# Patient Record
Sex: MALE | Race: WHITE | Employment: FULL TIME | URBAN - METROPOLITAN AREA
[De-identification: names, ages, dates, MRNs, and addresses within clinical notes are randomized per-mention and may not be internally consistent; named-entity substitution may affect disease eponyms.]

---

## 2017-01-31 ENCOUNTER — OFFICE VISIT (OUTPATIENT)
Dept: FAMILY MEDICINE CLINIC | Facility: CLINIC | Age: 47
End: 2017-01-31

## 2017-01-31 VITALS
WEIGHT: 315 LBS | TEMPERATURE: 98 F | DIASTOLIC BLOOD PRESSURE: 78 MMHG | BODY MASS INDEX: 41.75 KG/M2 | SYSTOLIC BLOOD PRESSURE: 132 MMHG | RESPIRATION RATE: 18 BRPM | HEART RATE: 110 BPM | HEIGHT: 73 IN

## 2017-01-31 DIAGNOSIS — J01.10 ACUTE FRONTAL SINUSITIS, RECURRENCE NOT SPECIFIED: Primary | ICD-10-CM

## 2017-01-31 PROCEDURE — 99214 OFFICE O/P EST MOD 30 MIN: CPT | Performed by: FAMILY MEDICINE

## 2017-01-31 RX ORDER — GUAIFENESIN AND CODEINE PHOSPHATE 100; 10 MG/5ML; MG/5ML
5 SOLUTION ORAL 3 TIMES DAILY PRN
Qty: 118 ML | Refills: 0 | Status: SHIPPED | OUTPATIENT
Start: 2017-01-31 | End: 2017-02-06

## 2017-01-31 RX ORDER — AMOXICILLIN AND CLAVULANATE POTASSIUM 875; 125 MG/1; MG/1
1 TABLET, FILM COATED ORAL 2 TIMES DAILY
Qty: 14 TABLET | Refills: 0 | Status: SHIPPED | OUTPATIENT
Start: 2017-01-31 | End: 2017-02-06 | Stop reason: ALTCHOICE

## 2017-01-31 NOTE — PROGRESS NOTES
CC:  Sidney Rayo is a 48year old male here for Patient presents with:  URI: head congestion, sore throat, cough with yellow phlegm, and dizziness started 4 days ago      HPI:     URI  -started 4 days ago  -associated with head congestions, sore throa c/w/r  CARDIO: RRR without g/m/r  EXTREMITIES: no cyanosis, clubbing or edema    ASSESSMENT AND PLAN:     URI   -given severe worsening of sinus pressure, will start augmentin as prescribed  -c/w tylenol cold and sinus  -- start guaifenesin with codeine pr

## 2017-01-31 NOTE — PATIENT INSTRUCTIONS
-- continue tylenol cold and sinus as needed  -- start augmentin 2x/day for 7 days - take with food  -- cough syrup as needed for severe cough - may cause drowsiness

## 2017-02-06 ENCOUNTER — OFFICE VISIT (OUTPATIENT)
Dept: FAMILY MEDICINE CLINIC | Facility: CLINIC | Age: 47
End: 2017-02-06

## 2017-02-06 VITALS
BODY MASS INDEX: 41.75 KG/M2 | DIASTOLIC BLOOD PRESSURE: 80 MMHG | RESPIRATION RATE: 16 BRPM | HEIGHT: 73 IN | TEMPERATURE: 98 F | OXYGEN SATURATION: 97 % | HEART RATE: 88 BPM | WEIGHT: 315 LBS | SYSTOLIC BLOOD PRESSURE: 130 MMHG

## 2017-02-06 DIAGNOSIS — J01.10 ACUTE FRONTAL SINUSITIS, RECURRENCE NOT SPECIFIED: Primary | ICD-10-CM

## 2017-02-06 PROCEDURE — 99214 OFFICE O/P EST MOD 30 MIN: CPT | Performed by: FAMILY MEDICINE

## 2017-02-06 RX ORDER — AZITHROMYCIN 250 MG/1
TABLET, FILM COATED ORAL
Qty: 6 TABLET | Refills: 0 | Status: SHIPPED | OUTPATIENT
Start: 2017-02-06 | End: 2018-08-02 | Stop reason: ALTCHOICE

## 2017-02-06 RX ORDER — GUAIFENESIN AND CODEINE PHOSPHATE 100; 10 MG/5ML; MG/5ML
5 SOLUTION ORAL 3 TIMES DAILY PRN
Qty: 118 ML | Refills: 0 | Status: SHIPPED | OUTPATIENT
Start: 2017-02-06 | End: 2017-02-20

## 2017-02-06 RX ORDER — METHYLPREDNISOLONE 4 MG/1
TABLET ORAL
Qty: 1 KIT | Refills: 0 | Status: SHIPPED | OUTPATIENT
Start: 2017-02-06 | End: 2018-08-02 | Stop reason: ALTCHOICE

## 2017-02-06 NOTE — PATIENT INSTRUCTIONS
-- start medrol steroid dose pack  -- continue cough medicine as needed  -- if symptoms not significantly improving by late tomorrow, start azithromycin antibiotics (take to pharmacy)  -- if you take these - also start otc probiotics once daily for one mon

## 2017-02-07 NOTE — PROGRESS NOTES
CC:  Yakov Barker is a 48year old male here for Patient presents with:   Follow - Up: Clogged Right ear, cough, congestion, sinus pressure, headache       HPI:     URI  -started about 1.5 wks ago  -last week was seen and given augmentin  -initially sta normal  NECK: supple, no thyromegaly, no LAD  LUNGS: clear to auscultation bilateraly, no c/w/r  CARDIO: RRR without g/m/r  EXTREMITIES: no cyanosis, clubbing or edema    ASSESSMENT AND PLAN:     Sinusitis  -given he just completed augmentin, will try medr

## 2018-08-02 ENCOUNTER — LAB ENCOUNTER (OUTPATIENT)
Dept: LAB | Age: 48
End: 2018-08-02
Attending: FAMILY MEDICINE
Payer: COMMERCIAL

## 2018-08-02 ENCOUNTER — OFFICE VISIT (OUTPATIENT)
Dept: FAMILY MEDICINE CLINIC | Facility: CLINIC | Age: 48
End: 2018-08-02
Payer: COMMERCIAL

## 2018-08-02 VITALS
WEIGHT: 315 LBS | HEIGHT: 73.23 IN | BODY MASS INDEX: 41.3 KG/M2 | TEMPERATURE: 98 F | SYSTOLIC BLOOD PRESSURE: 138 MMHG | DIASTOLIC BLOOD PRESSURE: 84 MMHG | HEART RATE: 86 BPM

## 2018-08-02 DIAGNOSIS — Z12.5 SCREENING FOR MALIGNANT NEOPLASM OF PROSTATE: ICD-10-CM

## 2018-08-02 DIAGNOSIS — E66.01 MORBID OBESITY WITH BMI OF 40.0-44.9, ADULT (HCC): ICD-10-CM

## 2018-08-02 DIAGNOSIS — Z00.00 ROUTINE GENERAL MEDICAL EXAMINATION AT A HEALTH CARE FACILITY: ICD-10-CM

## 2018-08-02 DIAGNOSIS — Z12.11 SCREENING FOR MALIGNANT NEOPLASM OF COLON: ICD-10-CM

## 2018-08-02 DIAGNOSIS — Z23 NEED FOR VACCINATION: ICD-10-CM

## 2018-08-02 DIAGNOSIS — Z00.00 ROUTINE GENERAL MEDICAL EXAMINATION AT A HEALTH CARE FACILITY: Primary | ICD-10-CM

## 2018-08-02 LAB
ALBUMIN SERPL-MCNC: 3.8 G/DL (ref 3.5–4.8)
ALBUMIN/GLOB SERPL: 1 {RATIO} (ref 1–2)
ALP LIVER SERPL-CCNC: 60 U/L (ref 45–117)
ALT SERPL-CCNC: 32 U/L (ref 17–63)
ANION GAP SERPL CALC-SCNC: 9 MMOL/L (ref 0–18)
AST SERPL-CCNC: 15 U/L (ref 15–41)
BASOPHILS # BLD AUTO: 0.05 X10(3) UL (ref 0–0.1)
BASOPHILS NFR BLD AUTO: 0.8 %
BILIRUB SERPL-MCNC: 0.3 MG/DL (ref 0.1–2)
BUN BLD-MCNC: 14 MG/DL (ref 8–20)
BUN/CREAT SERPL: 13.9 (ref 10–20)
CALCIUM BLD-MCNC: 9.4 MG/DL (ref 8.3–10.3)
CHLORIDE SERPL-SCNC: 108 MMOL/L (ref 101–111)
CHOLEST SMN-MCNC: 182 MG/DL (ref ?–200)
CO2 SERPL-SCNC: 25 MMOL/L (ref 22–32)
COMPLEXED PSA SERPL-MCNC: 0.71 NG/ML (ref 0.01–4)
CREAT BLD-MCNC: 1.01 MG/DL (ref 0.7–1.3)
EOSINOPHIL # BLD AUTO: 0.25 X10(3) UL (ref 0–0.3)
EOSINOPHIL NFR BLD AUTO: 4 %
ERYTHROCYTE [DISTWIDTH] IN BLOOD BY AUTOMATED COUNT: 13.2 % (ref 11.5–16)
GLOBULIN PLAS-MCNC: 3.9 G/DL (ref 2.5–3.7)
GLUCOSE BLD-MCNC: 90 MG/DL (ref 70–99)
HCT VFR BLD AUTO: 44 % (ref 37–53)
HDLC SERPL-MCNC: 39 MG/DL (ref 40–59)
HGB BLD-MCNC: 13.8 G/DL (ref 13–17)
IMMATURE GRANULOCYTE COUNT: 0.01 X10(3) UL (ref 0–1)
IMMATURE GRANULOCYTE RATIO %: 0.2 %
LDLC SERPL CALC-MCNC: 120 MG/DL (ref ?–100)
LYMPHOCYTES # BLD AUTO: 2.03 X10(3) UL (ref 0.9–4)
LYMPHOCYTES NFR BLD AUTO: 32.5 %
M PROTEIN MFR SERPL ELPH: 7.7 G/DL (ref 6.1–8.3)
MCH RBC QN AUTO: 28.4 PG (ref 27–33.2)
MCHC RBC AUTO-ENTMCNC: 31.4 G/DL (ref 31–37)
MCV RBC AUTO: 90.5 FL (ref 80–99)
MONOCYTES # BLD AUTO: 0.53 X10(3) UL (ref 0.1–1)
MONOCYTES NFR BLD AUTO: 8.5 %
NEUTROPHIL ABS PRELIM: 3.37 X10 (3) UL (ref 1.3–6.7)
NEUTROPHILS # BLD AUTO: 3.37 X10(3) UL (ref 1.3–6.7)
NEUTROPHILS NFR BLD AUTO: 54 %
NONHDLC SERPL-MCNC: 143 MG/DL (ref ?–130)
OSMOLALITY SERPL CALC.SUM OF ELEC: 294 MOSM/KG (ref 275–295)
PLATELET # BLD AUTO: 192 10(3)UL (ref 150–450)
POTASSIUM SERPL-SCNC: 4.3 MMOL/L (ref 3.6–5.1)
RBC # BLD AUTO: 4.86 X10(6)UL (ref 4.3–5.7)
RED CELL DISTRIBUTION WIDTH-SD: 43.8 FL (ref 35.1–46.3)
SODIUM SERPL-SCNC: 142 MMOL/L (ref 136–144)
T4 FREE SERPL-MCNC: 0.9 NG/DL (ref 0.9–1.8)
TRIGL SERPL-MCNC: 116 MG/DL (ref 30–149)
TSI SER-ACNC: 0.95 MIU/ML (ref 0.35–5.5)
VLDLC SERPL CALC-MCNC: 23 MG/DL (ref 0–30)
WBC # BLD AUTO: 6.2 X10(3) UL (ref 4–13)

## 2018-08-02 PROCEDURE — 90715 TDAP VACCINE 7 YRS/> IM: CPT | Performed by: FAMILY MEDICINE

## 2018-08-02 PROCEDURE — 85025 COMPLETE CBC W/AUTO DIFF WBC: CPT

## 2018-08-02 PROCEDURE — 84439 ASSAY OF FREE THYROXINE: CPT

## 2018-08-02 PROCEDURE — 80053 COMPREHEN METABOLIC PANEL: CPT

## 2018-08-02 PROCEDURE — 80061 LIPID PANEL: CPT

## 2018-08-02 PROCEDURE — 36415 COLL VENOUS BLD VENIPUNCTURE: CPT

## 2018-08-02 PROCEDURE — 99396 PREV VISIT EST AGE 40-64: CPT | Performed by: FAMILY MEDICINE

## 2018-08-02 PROCEDURE — 90471 IMMUNIZATION ADMIN: CPT | Performed by: FAMILY MEDICINE

## 2018-08-02 PROCEDURE — 84443 ASSAY THYROID STIM HORMONE: CPT

## 2018-08-02 NOTE — PATIENT INSTRUCTIONS
Try to eat small meals every 3 hours. Recommend lean meats such as skinless chicken breast, 90% lean ground beef, lean ground turkey, pork loin, and any type of fish. Bake, broil or grill. No frying. Avoid cooking in oils.   Okay to use non-stick  The tests listed below are recommended for routine healthcare by the [address] Task Force (USPSTF) and the [employer] of [employer] (AAFP). They are the minimum checkup recommendations.  You must discuss with your healthcare provider Prostate cancer tests: The older way of looking for prostate cancer, the rectal exam, is no longer regarded as the best way to screen for prostate cancer.  The PSA, (prostate specific antigen) test is widely used to look for prostate cancer, but its use as Many other tests are often done at routine checkups, but there is no current evidence that they are helpful as routine screening tests for healthy men. Examples of such tests are a CBC (complete blood count), thyroid tests, and urine tests.  When you have n Varicella (chickenpox) if you have never had chickenpox. Zoster (shingles) vaccine: if you are 60 or older. The vaccine can help prevent shingles. It can also reduce the pain caused by shingles. What other things I can do to stay healthy?    You should

## 2018-08-02 NOTE — PROGRESS NOTES
Michael Torrez is a 46year old male who presents for a complete physical exam.   HPI:       No complaints.       Wt Readings from Last 6 Encounters:  08/02/18 : (!) 315 lb  02/06/17 : (!) 328 lb  01/31/17 : (!) 327 lb  07/08/15 : (!) 315 lb  06/17/15 : ( appetite  : denies nocturia or changes in urinary stream, denies scrotal mass/abnormal discharge from urethra  MUSCULOSKELETAL: denies joint or muscle aches or pains  NEURO: denies headaches/dizziness  PSYCH: denies depression or anxiety  HEMATOLOGIC: de neoplasm of prostate  Screening for malignant neoplasm of colon      1. Routine general medical examination at a health care facility  Patient provided handout on men's health and prevention. Routine health profile labs pending.   Recommend healthy diet

## 2018-08-29 ENCOUNTER — HOSPITAL ENCOUNTER (OUTPATIENT)
Age: 48
Discharge: HOME | End: 2018-08-29
Payer: COMMERCIAL

## 2018-08-29 ENCOUNTER — APPOINTMENT (OUTPATIENT)
Dept: GENERAL RADIOLOGY | Age: 48
End: 2018-08-29
Attending: PHYSICIAN ASSISTANT
Payer: COMMERCIAL

## 2018-08-29 VITALS
DIASTOLIC BLOOD PRESSURE: 95 MMHG | OXYGEN SATURATION: 97 % | RESPIRATION RATE: 20 BRPM | HEART RATE: 77 BPM | TEMPERATURE: 98 F | SYSTOLIC BLOOD PRESSURE: 137 MMHG

## 2018-08-29 DIAGNOSIS — S61.311A LACERATION OF LEFT INDEX FINGER WITHOUT FOREIGN BODY WITH DAMAGE TO NAIL, INITIAL ENCOUNTER: Primary | ICD-10-CM

## 2018-08-29 PROCEDURE — 73140 X-RAY EXAM OF FINGER(S): CPT | Performed by: PHYSICIAN ASSISTANT

## 2018-08-29 PROCEDURE — 12002 RPR S/N/AX/GEN/TRNK2.6-7.5CM: CPT

## 2018-08-29 PROCEDURE — 96372 THER/PROPH/DIAG INJ SC/IM: CPT

## 2018-08-29 PROCEDURE — 99204 OFFICE O/P NEW MOD 45 MIN: CPT

## 2018-08-29 PROCEDURE — 99214 OFFICE O/P EST MOD 30 MIN: CPT

## 2018-08-29 RX ORDER — CEPHALEXIN 500 MG/1
500 TABLET ORAL 4 TIMES DAILY
Qty: 40 TABLET | Refills: 0 | Status: SHIPPED | OUTPATIENT
Start: 2018-08-29 | End: 2018-09-08

## 2018-08-29 RX ORDER — IBUPROFEN 600 MG/1
600 TABLET ORAL ONCE
Status: COMPLETED | OUTPATIENT
Start: 2018-08-29 | End: 2018-08-29

## 2018-08-29 NOTE — ED PROVIDER NOTES
Patient Seen in: [facility]    History   Patient presents with:  Laceration    Stated Complaint: LAC TO INDEX FINGER    HPI    66-year-old male here with complaint of a laceration/amputation to his left hand second digit that Constitutional: He is oriented to person, place, and time. He appears well-developed and well-nourished. HENT:   Head: Normocephalic and atraumatic.    Right Ear: External ear normal.   Left Ear: External ear normal.   Nose: Nose normal.   Mouth/Throat: O ------------------------------------------------------------      [facility]       Clinical Impression: L hand 2nd digit laceration with partial amputation  Course of Treatment: Gently cleanse the area daily.   Try not to dislodge the Gelfoam.  The cephalexin as p

## 2018-08-29 NOTE — ED INITIAL ASSESSMENT (HPI)
Left index finger- laceration sustained from router bit today at 12 noon while cutting a piece of wood. Had tdap a month ago.

## 2018-08-31 ENCOUNTER — HOSPITAL ENCOUNTER (OUTPATIENT)
Age: 48
Discharge: HOME | End: 2018-08-31
Payer: COMMERCIAL

## 2018-08-31 VITALS
OXYGEN SATURATION: 98 % | DIASTOLIC BLOOD PRESSURE: 82 MMHG | TEMPERATURE: 98 F | SYSTOLIC BLOOD PRESSURE: 152 MMHG | RESPIRATION RATE: 17 BRPM | HEART RATE: 71 BPM

## 2018-08-31 DIAGNOSIS — Z51.89 ENCOUNTER FOR WOUND RE-CHECK: Primary | ICD-10-CM

## 2018-08-31 PROCEDURE — 99211 OFF/OP EST MAY X REQ PHY/QHP: CPT

## 2018-08-31 NOTE — ED PROVIDER NOTES
Patient Seen in: [facility]    History   Patient presents with:  Laceration Abrasion (integumentary)    Stated Complaint: wound check - seen here wed 8-29-18    HPI    55-year-old male here for wound check of a laceration to his Head: Normocephalic and atraumatic.    Right Ear: External ear normal.   Left Ear: External ear normal.   Nose: Nose normal.   Mouth/Throat: Oropharynx is clear and moist.   Eyes: Conjunctivae and EOM are normal. Pupils are equal, round, and reactive to lig Medications Prescribed:  Current Discharge Medication List

## 2020-07-31 ENCOUNTER — TELEPHONE (OUTPATIENT)
Dept: FAMILY MEDICINE CLINIC | Facility: CLINIC | Age: 50
End: 2020-07-31

## 2020-07-31 ENCOUNTER — OFFICE VISIT (OUTPATIENT)
Dept: FAMILY MEDICINE CLINIC | Facility: CLINIC | Age: 50
End: 2020-07-31
Payer: COMMERCIAL

## 2020-07-31 VITALS
DIASTOLIC BLOOD PRESSURE: 80 MMHG | SYSTOLIC BLOOD PRESSURE: 130 MMHG | WEIGHT: 315 LBS | OXYGEN SATURATION: 95 % | BODY MASS INDEX: 41.3 KG/M2 | HEIGHT: 73.23 IN | HEART RATE: 80 BPM | TEMPERATURE: 97 F

## 2020-07-31 DIAGNOSIS — E66.01 MORBID OBESITY WITH BMI OF 40.0-44.9, ADULT (HCC): ICD-10-CM

## 2020-07-31 DIAGNOSIS — R22.1 MASS OF LATERAL NECK: Primary | ICD-10-CM

## 2020-07-31 DIAGNOSIS — Z12.11 SCREENING FOR MALIGNANT NEOPLASM OF COLON: ICD-10-CM

## 2020-07-31 DIAGNOSIS — R12 HEARTBURN: ICD-10-CM

## 2020-07-31 DIAGNOSIS — E78.00 HYPERCHOLESTEROLEMIA: ICD-10-CM

## 2020-07-31 PROCEDURE — 3008F BODY MASS INDEX DOCD: CPT | Performed by: FAMILY MEDICINE

## 2020-07-31 PROCEDURE — 3075F SYST BP GE 130 - 139MM HG: CPT | Performed by: FAMILY MEDICINE

## 2020-07-31 PROCEDURE — 99214 OFFICE O/P EST MOD 30 MIN: CPT | Performed by: FAMILY MEDICINE

## 2020-07-31 PROCEDURE — 3079F DIAST BP 80-89 MM HG: CPT | Performed by: FAMILY MEDICINE

## 2020-07-31 NOTE — TELEPHONE ENCOUNTER
[place] is calling to let Dr Michael Craig know the Dr Mehran Barbosa is not in his network, so he would like a call to see if he can see someone else, Please call [place] at [phone]

## 2020-07-31 NOTE — PATIENT INSTRUCTIONS
-Make an appointment sooner if you would like to discuss plan for weight loss. Understanding Your Cholesterol Numbers     Blood flows easily when arteries are clear. Less blood flows when cholesterol builds up in artery walls. increased risk for health problems. My total cholesterol is: ________________   Your lipid numbers  Total cholesterol is just one part of the story. Cholesterol is made up of different kinds of fats (lipids).  If your total cholesterol is high, knowing yo attack, stroke, and peripheral artery disease. If your cholesterol levels are higher than normal, your healthcare provider will help you with steps to take to lower your levels.  Steps may include lifestyle changes such as diet, physical activity, and quitt weight for you. Reasons to lose weight  Losing weight can help with some health problems, such as high blood pressure, heart disease, diabetes, sleep apnea, and arthritis. You may also feel more energy.    Set your long-term goal  Your goal doesn't even reserved. This information is not intended as a substitute for professional medical care. Always follow your healthcare professional's instructions.             Recommend lean meats such as skinless chicken breast, 90% lean ground beef, lean ground turkey,

## 2020-07-31 NOTE — TELEPHONE ENCOUNTER
Pt said Dr. Bryanna Moya is not in his network. He needs a referral to see Dr. Ekaterina He in [facility] on Monday.

## 2020-08-08 PROBLEM — E78.00 HYPERCHOLESTEROLEMIA: Status: ACTIVE | Noted: 2020-08-08

## 2020-08-08 PROBLEM — R22.1 MASS OF LATERAL NECK: Status: ACTIVE | Noted: 2020-08-08

## 2020-08-08 PROBLEM — R12 HEARTBURN: Status: ACTIVE | Noted: 2020-08-08

## 2020-08-09 NOTE — PROGRESS NOTES
Edward Wharton is a 44year old male. HPI:       Mass of neck:  Patient was seen by another provider and was recommended to see a general surgeon.   Patient states the mass of the left side of the lower neck is been present for a few to several months headaches/dizziness/fainting/weakness/change in vision  PSYCH: denies depression and anxiety      Immunization History  Administered            Date(s) Administered    TDAP                  08/02/2018      EXAM:   /80 (BP Location: Left arm, Patient Hypercholesterolemia  3-month trial of therapeutic lifestyle changes. Recheck lipid panel in 3 months, then follow-up office visit. - LIPID PANEL; Future  - LIPID PANEL    3. Morbid obesity with BMI of 40.0-44.9, adult (HCC)  Weight loss advised.   Argenis

## 2020-08-17 LAB
HCT: 45.7
HGB: 14.8 G/DL
INR: 1 (ref 0.8–1.2)
Lab: 0 10ˆ3/ΜL (ref 0–0.01)
MCH: 29.1 PG
MCHC: 32.4 G/DL
MCV: 90 FL
NRBC: 0
PLATELETS: 190 X10E3/UL
PT: 12.8 SEC (ref 8.7–11.5)
RBC: 5.08 /HPF
RDW: 12.9 %
WBC: 6.93 /HPF

## 2020-08-29 LAB — SARS COV2 COVID19 PCR: NEGATIVE

## 2020-09-29 ENCOUNTER — TELEPHONE (OUTPATIENT)
Dept: FAMILY MEDICINE CLINIC | Facility: CLINIC | Age: 50
End: 2020-09-29

## 2020-09-29 LAB — SARS COV2 COVID19 PCR: NOT DETECTED

## 2020-09-29 NOTE — TELEPHONE ENCOUNTER
Received form from [address] [place] [address], patient was tested for COVID-19 on 09/25/2020. It was not detected at this time. Report in Dr Brook Tobias red folder for review.

## 2022-10-07 ENCOUNTER — HOSPITAL ENCOUNTER (EMERGENCY)
Facility: HOSPITAL | Age: 52
Discharge: HOME | End: 2022-10-07
Attending: EMERGENCY MEDICINE
Payer: COMMERCIAL

## 2022-10-07 ENCOUNTER — APPOINTMENT (OUTPATIENT)
Dept: CT IMAGING | Age: 52
End: 2022-10-07
Attending: EMERGENCY MEDICINE
Payer: COMMERCIAL

## 2022-10-07 ENCOUNTER — TELEPHONE (OUTPATIENT)
Dept: FAMILY MEDICINE CLINIC | Facility: CLINIC | Age: 52
End: 2022-10-07

## 2022-10-07 ENCOUNTER — HOSPITAL ENCOUNTER (OUTPATIENT)
Age: 52
Discharge: ANOTHER HOSPITAL | End: 2022-10-07
Attending: EMERGENCY MEDICINE
Payer: COMMERCIAL

## 2022-10-07 ENCOUNTER — APPOINTMENT (OUTPATIENT)
Dept: CT IMAGING | Facility: HOSPITAL | Age: 52
End: 2022-10-07
Attending: EMERGENCY MEDICINE
Payer: COMMERCIAL

## 2022-10-07 VITALS
HEART RATE: 77 BPM | OXYGEN SATURATION: 97 % | BODY MASS INDEX: 41.75 KG/M2 | TEMPERATURE: 98 F | WEIGHT: 315 LBS | RESPIRATION RATE: 18 BRPM | HEIGHT: 73 IN | DIASTOLIC BLOOD PRESSURE: 91 MMHG | SYSTOLIC BLOOD PRESSURE: 140 MMHG

## 2022-10-07 VITALS
RESPIRATION RATE: 18 BRPM | HEART RATE: 69 BPM | SYSTOLIC BLOOD PRESSURE: 139 MMHG | HEIGHT: 73 IN | DIASTOLIC BLOOD PRESSURE: 83 MMHG | BODY MASS INDEX: 41.75 KG/M2 | WEIGHT: 315 LBS | TEMPERATURE: 98 F | OXYGEN SATURATION: 97 %

## 2022-10-07 DIAGNOSIS — R10.9 FLANK PAIN: Primary | ICD-10-CM

## 2022-10-07 DIAGNOSIS — M54.9 ACUTE RIGHT-SIDED BACK PAIN, UNSPECIFIED BACK LOCATION: Primary | ICD-10-CM

## 2022-10-07 LAB
ALBUMIN SERPL-MCNC: 3.7 G/DL (ref 3.4–5)
ALP LIVER SERPL-CCNC: 64 U/L
ALT SERPL-CCNC: 31 U/L
AST SERPL-CCNC: 16 U/L (ref 15–37)
BASOPHILS # BLD: 1.25 X10(3) UL (ref 0–0.2)
BASOPHILS NFR BLD: 6 %
BILIRUB DIRECT SERPL-MCNC: <0.1 MG/DL (ref 0–0.2)
BILIRUB SERPL-MCNC: 0.4 MG/DL (ref 0.1–2)
BUN BLD-MCNC: 13 MG/DL (ref 7–18)
CHLORIDE BLD-SCNC: 102 MMOL/L (ref 98–112)
CO2 BLD-SCNC: 25 MMOL/L (ref 21–32)
CREAT BLD-MCNC: 1.3 MG/DL
EOSINOPHIL # BLD: 1.05 X10(3) UL (ref 0–0.7)
EOSINOPHIL NFR BLD: 5 %
ERYTHROCYTE [DISTWIDTH] IN BLOOD BY AUTOMATED COUNT: 13.9 %
GFR SERPLBLD BASED ON 1.73 SQ M-ARVRAT: 65 ML/MIN/1.73M2 (ref 60–?)
GLUCOSE BLD-MCNC: 90 MG/DL (ref 70–99)
HCT VFR BLD AUTO: 42.9 %
HCT VFR BLD AUTO: 43.1 %
HCT VFR BLD CALC: 44 %
HGB BLD-MCNC: 13.7 G/DL
HGB BLD-MCNC: 14.2 G/DL
ISTAT IONIZED CALCIUM FOR CHEM 8: 1.25 MMOL/L (ref 1.12–1.32)
LIPASE SERPL-CCNC: 112 U/L (ref 73–393)
LYMPHOCYTES NFR BLD: 21 %
LYMPHOCYTES NFR BLD: 4.39 X10(3) UL (ref 1–4)
MCH RBC QN AUTO: 27.7 PG (ref 26–34)
MCH RBC QN AUTO: 28.6 PG (ref 26–34)
MCHC RBC AUTO-ENTMCNC: 31.8 G/DL (ref 31–37)
MCHC RBC AUTO-ENTMCNC: 33.1 G/DL (ref 31–37)
MCV RBC AUTO: 86.5 FL
MCV RBC AUTO: 87.2 FL (ref 80–100)
MONOCYTES # BLD: 0.84 X10(3) UL (ref 0.1–1)
MONOCYTES NFR BLD: 4 %
MORPHOLOGY: NORMAL
NEUTROPHILS # BLD AUTO: 12.32 X10 (3) UL (ref 1.5–7.7)
NEUTROPHILS NFR BLD: 62 %
NEUTS BAND NFR BLD: 2 %
NEUTS HYPERSEG # BLD: 13.38 X10(3) UL (ref 1.5–7.7)
PLATELET # BLD AUTO: 438 10(3)UL (ref 150–450)
PLATELET MORPHOLOGY: NORMAL
POCT BILIRUBIN URINE: NEGATIVE
POCT BLOOD URINE: NEGATIVE
POCT GLUCOSE URINE: NEGATIVE MG/DL
POCT KETONE URINE: NEGATIVE MG/DL
POCT LEUKOCYTE ESTERASE URINE: NEGATIVE
POCT NITRITE URINE: NEGATIVE
POCT PH URINE: 6 (ref 5–8)
POCT PROTEIN URINE: NEGATIVE MG/DL
POCT SPECIFIC GRAVITY URINE: 1.02
POCT URINE CLARITY: CLEAR
POCT URINE COLOR: YELLOW
POCT UROBILINOGEN URINE: 0.2 MG/DL
POTASSIUM BLD-SCNC: 4.5 MMOL/L (ref 3.6–5.1)
PROT SERPL-MCNC: 7.7 G/DL (ref 6.4–8.2)
RBC # BLD AUTO: 4.94 X10ˆ6/UL
RBC # BLD AUTO: 4.96 X10(6)UL
SODIUM BLD-SCNC: 139 MMOL/L (ref 136–145)
TOTAL CELLS COUNTED BLD: 100
WBC # BLD AUTO: 20.2 X10ˆ3/UL (ref 4–11)
WBC # BLD AUTO: 20.9 X10(3) UL (ref 4–11)

## 2022-10-07 PROCEDURE — 85027 COMPLETE CBC AUTOMATED: CPT | Performed by: EMERGENCY MEDICINE

## 2022-10-07 PROCEDURE — 36415 COLL VENOUS BLD VENIPUNCTURE: CPT

## 2022-10-07 PROCEDURE — 99213 OFFICE O/P EST LOW 20 MIN: CPT

## 2022-10-07 PROCEDURE — 99284 EMERGENCY DEPT VISIT MOD MDM: CPT | Performed by: EMERGENCY MEDICINE

## 2022-10-07 PROCEDURE — 81002 URINALYSIS NONAUTO W/O SCOPE: CPT | Performed by: EMERGENCY MEDICINE

## 2022-10-07 PROCEDURE — 87040 BLOOD CULTURE FOR BACTERIA: CPT | Performed by: EMERGENCY MEDICINE

## 2022-10-07 PROCEDURE — 83690 ASSAY OF LIPASE: CPT | Performed by: EMERGENCY MEDICINE

## 2022-10-07 PROCEDURE — 36415 COLL VENOUS BLD VENIPUNCTURE: CPT | Performed by: EMERGENCY MEDICINE

## 2022-10-07 PROCEDURE — 85025 COMPLETE CBC W/AUTO DIFF WBC: CPT | Performed by: EMERGENCY MEDICINE

## 2022-10-07 PROCEDURE — 74177 CT ABD & PELVIS W/CONTRAST: CPT | Performed by: EMERGENCY MEDICINE

## 2022-10-07 PROCEDURE — 80047 BASIC METABLC PNL IONIZED CA: CPT

## 2022-10-07 PROCEDURE — 80076 HEPATIC FUNCTION PANEL: CPT | Performed by: EMERGENCY MEDICINE

## 2022-10-07 RX ORDER — KETOROLAC TROMETHAMINE 30 MG/ML
15 INJECTION, SOLUTION INTRAMUSCULAR; INTRAVENOUS ONCE
Status: DISCONTINUED | OUTPATIENT
Start: 2022-10-07 | End: 2022-10-07

## 2022-10-07 RX ORDER — HYDROCODONE BITARTRATE AND ACETAMINOPHEN 5; 325 MG/1; MG/1
1-2 TABLET ORAL EVERY 6 HOURS PRN
Qty: 20 TABLET | Refills: 0 | Status: SHIPPED | OUTPATIENT
Start: 2022-10-07 | End: 2022-10-12

## 2022-10-07 RX ORDER — SODIUM CHLORIDE 9 MG/ML
1000 INJECTION, SOLUTION INTRAVENOUS ONCE
Status: DISCONTINUED | OUTPATIENT
Start: 2022-10-07 | End: 2022-10-07

## 2022-10-07 NOTE — ED INITIAL ASSESSMENT (HPI)
Patient initially went to the immediate care for mid back pain that has been ongoing for the past four weeks however while there was found to have an elevated WBC. Patient was sent ot he ED for furher evaluation.

## 2022-10-07 NOTE — TELEPHONE ENCOUNTER
Pt's wife called and stated that the pt has been having back pain for the past week and getting worse. Stated that pt believes it might be kidney stones. Pt has an upcoming apt on 10/31 but is requesting a sooner apt.

## 2022-10-07 NOTE — ED INITIAL ASSESSMENT (HPI)
For the past 4 weeks, c/o right low back pain. Denies fevers, urinary symptoms, N/V/D or injury. Took Tylenol last night.

## 2022-10-07 NOTE — TELEPHONE ENCOUNTER
Spoke to patient. C/o right flank pain for about 2-3 weeks. States sometimes pain is sharp if he turns a certain way. Has tried pain relieving patches with no relief. Pain increased last night and had difficulty sleeping. He denies fever or chills. Denies obvious blood in urine. Denies nausea. He says this pain is different from back pain he sometimes has. Advised him to proceed to IC for evaluation and treatment. He [place].

## 2023-09-05 PROBLEM — D72.829 LEUKOCYTOSIS: Status: ACTIVE | Noted: 2022-12-07

## 2023-09-05 PROBLEM — R16.1 SPLENOMEGALY: Status: ACTIVE | Noted: 2022-12-07

## 2023-09-05 PROBLEM — C43.9 MALIGNANT MELANOMA (HCC): Status: ACTIVE | Noted: 2020-08-27

## 2023-09-05 PROBLEM — C79.89 METASTATIC MELANOMA TO HEAD AND NECK (HCC): Status: ACTIVE | Noted: 2020-09-17

## 2023-09-05 PROBLEM — D75.839 THROMBOCYTOSIS: Status: ACTIVE | Noted: 2022-12-07

## 2023-09-06 ENCOUNTER — OFFICE VISIT (OUTPATIENT)
Dept: FAMILY MEDICINE CLINIC | Facility: CLINIC | Age: 53
End: 2023-09-06
Payer: COMMERCIAL

## 2023-09-06 DIAGNOSIS — C92.10 CML (CHRONIC MYELOCYTIC LEUKEMIA) (HCC): ICD-10-CM

## 2023-09-06 DIAGNOSIS — Z23 NEED FOR PNEUMOCOCCAL VACCINATION: ICD-10-CM

## 2023-09-06 DIAGNOSIS — R10.9 FLANK PAIN, CHRONIC: ICD-10-CM

## 2023-09-06 DIAGNOSIS — Z00.00 ROUTINE GENERAL MEDICAL EXAMINATION AT A HEALTH CARE FACILITY: Primary | ICD-10-CM

## 2023-09-06 DIAGNOSIS — G89.29 FLANK PAIN, CHRONIC: ICD-10-CM

## 2023-09-06 DIAGNOSIS — C79.89 METASTATIC MELANOMA TO HEAD AND NECK (HCC): ICD-10-CM

## 2023-09-06 DIAGNOSIS — R16.1 SPLENOMEGALY: ICD-10-CM

## 2023-09-06 DIAGNOSIS — Z12.11 SCREEN FOR COLON CANCER: ICD-10-CM

## 2023-09-06 DIAGNOSIS — Z12.5 SCREENING FOR PROSTATE CANCER: ICD-10-CM

## 2023-09-06 DIAGNOSIS — E66.01 MORBID OBESITY WITH BMI OF 40.0-44.9, ADULT (HCC): ICD-10-CM

## 2023-09-06 PROBLEM — D47.1 MPN (MYELOPROLIFERATIVE NEOPLASM) (HCC): Status: ACTIVE | Noted: 2023-01-03

## 2023-09-06 LAB
APPEARANCE: CLEAR
BILIRUBIN: NEGATIVE
GLUCOSE (URINE DIPSTICK): NEGATIVE MG/DL
KETONES (URINE DIPSTICK): NEGATIVE MG/DL
LEUKOCYTES: NEGATIVE
MULTISTIX LOT#: ABNORMAL NUMERIC
NITRITE, URINE: NEGATIVE
PH, URINE: 5.5 (ref 4.5–8)
PROTEIN (URINE DIPSTICK): NEGATIVE MG/DL
SPECIFIC GRAVITY: 1.02 (ref 1–1.03)
URINE-COLOR: YELLOW
UROBILINOGEN,SEMI-QN: 0.2 MG/DL (ref 0–1.9)

## 2023-09-06 PROCEDURE — 99214 OFFICE O/P EST MOD 30 MIN: CPT | Performed by: FAMILY MEDICINE

## 2023-09-06 PROCEDURE — 90677 PCV20 VACCINE IM: CPT | Performed by: FAMILY MEDICINE

## 2023-09-06 PROCEDURE — 81003 URINALYSIS AUTO W/O SCOPE: CPT | Performed by: FAMILY MEDICINE

## 2023-09-06 PROCEDURE — 90471 IMMUNIZATION ADMIN: CPT | Performed by: FAMILY MEDICINE

## 2023-09-06 PROCEDURE — 99386 PREV VISIT NEW AGE 40-64: CPT | Performed by: FAMILY MEDICINE

## 2023-09-06 RX ORDER — TIZANIDINE 4 MG/1
4 TABLET ORAL NIGHTLY PRN
Qty: 20 TABLET | Refills: 0 | Status: SHIPPED | OUTPATIENT
Start: 2023-09-06

## 2023-09-06 NOTE — PROGRESS NOTES
Maverick Fitzpatrick is a 52year old male       Patient presents with:  Back Pain: Right low back  Wellness Visit          HPI:     New patient, patient last seen 7/31/2020. Since last office visit patient has been diagnosed with melanoma to head and neck as well as CML. He was being treated at [facility] but [place] [facility]. Chronic right-sided torso pain:  Points to right posterior flank area. Pain is constant and nature is sore. Pain level is a 2/10. Rarely it goes away, and it is for a short period. No alleviating factors, has tried OTC NSAIDs. If rolls over in bed in can be a sharp pain, otherwise no other known aggrav factors. Duration is approx 12 months. Pain level has been about the same. Denies constipation. No blood in stools or black stools. He has talked to his oncologist about this and they did imaging and patient states they did not find anything suspicious in the imaging. Wt Readings from Last 6 Encounters:  10/07/22 : (!) 315 lb (142.9 kg)  10/07/22 : (!) 315 lb (142.9 kg)  07/31/20 : (!) 320 lb 9.6 oz (145.4 kg)  08/02/18 : (!) 315 lb (142.9 kg)  02/06/17 : (!) 328 lb (148.8 kg)  01/31/17 : (!) 327 lb (148.3 kg)    There is no height or weight on file to calculate BMI.            Patient Active Problem List:     History of femur fracture     Internal derangement of left knee     Knee pain, left     Morbid obesity with BMI of 40.0-44.9, adult (HCC)     Osteoarthrosis, unspecified whether generalized or localized, lower leg     Gastroesophageal reflux disease with esophagitis     Routine general medical examination at a health care facility     Mass of lateral neck     Hypercholesterolemia     Heartburn     Leukocytosis     Malignant melanoma ([facility] [address]     Metastatic melanoma to head and neck (HCC)     Splenomegaly     Thrombocytosis     CML (chronic myelocytic leukemia) (HCC)     MPN (myeloproliferative neoplasm) (HCC)     Flank pain, chronic    Current Outpatient Medications   Medication Sig Dispense Refill    tiZANidine 4 MG Oral Tab Take 1 tablet (4 mg total) by mouth nightly as needed. 20 tablet 0    acyclovir 400 MG Oral Tab Take 1 tablet (400 mg total) by mouth 2 (two) times daily. imatinib 400 MG Oral Tab Take 1 tablet (400 mg total) by mouth daily. sulfamethoxazole-trimethoprim -160 MG Oral Tab per tablet TAKE 1 TABLET BY MOUTH ON MONDAY, WEDNESDAY, FRIDAY ONLY      allopurinol 300 MG Oral Tab Take 1 tablet (300 mg total) by mouth daily. (Patient not taking: Reported on 9/6/2023)      prochlorperazine 5 MG Oral Take by mouth. (Patient not taking: Reported on 9/6/2023)        Past Medical History:   Diagnosis Date    Cancer [facility])     History of femur fracture 7/9/2014    Left knee around age [age]yo     Morbid obesity with BMI of 40.0-44.9, adult ([facility] [address] 7/9/2014      Past Surgical History:   Procedure Laterality Date    [place]    at age 10     OTHER  2019    Left pointer finger had stitches. OTHER Left     melanoma removed to the left side of head      No family history on file. Social History:  Social History     Socioeconomic History    Marital status:    Tobacco Use    Smoking status: Never    Smokeless tobacco: Never   Vaping Use    Vaping Use: Never used   Substance and Sexual Activity    Alcohol use: Yes     Alcohol/week: 0.0 standard drinks of alcohol     Comment: minimal    Drug use: No   Other Topics Concern    Caffeine Concern No    Exercise No    Seat Belt Yes        Occ: . Marital Status: . Children: n/a. Exercise:  walks a great deal at work . Diet: doesn't watch     REVIEW OF SYSTEMS:   GENERAL: Feels well overall. Denies fever or chills. SKIN: Denies any new or changing skin lesions. EYES: Denies changes in vision. HENT: Denies upper respiratory symptoms. LUNGS: Denies KAREN, wheezing, cough. Dyspnea on exertion. CARDIOVASCULAR: Denies CP or palpitations.  Denies chest pain on exertion. GI: Denies abdominal pain, denies heartburn, denies n/v/c/d/change in stools/blood in stool/black stool/change in appetite  : Denies nocturia or changes in urinary stream. Denies scrotal mass/abnormal discharge from urethra. MUSCULOSKELETAL: As in HPI  NEURO: Denies headaches/dizziness  PSYCH: Denies depression or anxiety  HEMATOLOGIC: No complaints of easy bruising/bleeding/anemia/blood clot disorders  ENDOCRINE: No complaints of enlarged neck glands/polyuria/polydypsia. EXAM:   There were no vitals taken for this visit. There is no height or weight on file to calculate BMI. GENERAL: NAD. Pleasant, well developed, nonill appearing obese  male  SKIN: No visible rashes. No visible suspicious lesions. HENT: NCAT, EACs clear b/l, TMs normal b/l. Nose: No nasal discharge. OP: MMM. Posteriorly no exudate or erythema. EYES: PERRL. EOMI. Conjunctiva non-injected. Non-icteric. NECK: Supple. No cervical or supraclavicular adenopathy, no thyromegaly/thyroid nodules appreciated, no masses  LUNGS: CTA A/P, no wheezes/ronchi/rales/crackles, normal air excursion  CARDIOVASCULAR: RRR, no murmur. No lower extremity edema. Abdomen: Obese. Non-tender to palpation, no HSM/masses/pulsations appreciated. MUSCULOSKELETAL: Back with normal AROM, no joint swelling. Ribs posterior and anterior bilaterally nontender to palpation. EXTREMITIES: No cyanosis, clubbing, or edema  NEURO: A&O x3. No gross motor or gross sensory abnormality. PSYCH: Normal affect. No apparent thought disorder. Average judgement and insight.       Immunization History  Administered            Date(s) Administered    Covid-19 Vaccine Pfizer 30 mcg/0.3 ml                          03/25/2021  04/15/2021  02/01/2022      Pneumococcal Conjugate PCV20                          09/06/2023      TDAP                  08/02/2018          DATA:      Results for orders placed or performed in visit on 09/06/23   Urine Dip, auto without Micro    Collection Time: 09/06/23 12:03 PM   Result Value Ref Range    Glucose Urine Negative Negative mg/dL    Bilirubin Urine Negative Negative    Ketones, UA Negative Negative - Trace mg/dL    Spec Gravity 1.020 1.005 - 1.030    Blood Urine Trace-lysed (A) Negative    PH Urine 5.5 5.0 - 8.0    Protein Urine Negative Negative - Trace mg/dL    Urobilinogen Urine 0.2 0.2 - 1.0 mg/dL    Nitrite Urine Negative Negative    Leukocyte Esterase Urine Negative Negative    APPEARANCE clear Clear    Color Urine yellow Yellow    Multistix Lot# [id number] Numeric    Multistix Expiration Date 4/30/2024 Date   Lipid Panel    Collection Time: 09/08/23  1:30 PM   Result Value Ref Range    Cholesterol, Total 174 <200 mg/dL    HDL Cholesterol 44 40 - 59 mg/dL    Triglycerides 122 30 - 149 mg/dL    LDL Cholesterol 108 (H) <100 mg/dL    VLDL 21 0 - 30 mg/dL    Non HDL Chol 130 (H) <130 mg/dL    Patient Fasting for Lipid? Yes    Assay, Thyroid Stim Hormone    Collection Time: 09/08/23  1:30 PM   Result Value Ref Range    TSH 1.330 0.358 - 3.740 mIU/mL   Free T4, (Free Thyroxine)    Collection Time: 09/08/23  1:30 PM   Result Value Ref Range    Free T4 1.0 0.8 - 1.7 ng/dL         ASSESSMENT AND PLAN:   Byron Lazo is a 52year old male who presents for a complete physical exam.       Routine general medical examination at a health care facility  (primary encounter diagnosis)  Need for pneumococcal vaccination  Screening for prostate cancer  Screen for colon cancer  Flank pain, chronic  Splenomegaly  Morbid obesity with bmi of 40.0-44.9, adult (hcc)  Metastatic melanoma to head and neck (hcc)  Cml (chronic myelocytic leukemia) (hcc)      1. Routine general medical examination at a health care facility  Patient provided handout on men's health and prevention. Routine health profile labs pending.    - Lipid Panel  - Assay, Thyroid Stim Hormone  - Free T4, (Free Thyroxine)    2.  Need for pneumococcal vaccination  - PCV20 (Prevnar 20)    3. Screening for prostate cancer    4. Screen for colon cancer  - INSURE FECAL GLOBIN by IMMUNOASSAY [[id number]] [Q]    5. Flank pain, chronic  In office UA benign. Trial of muscle relaxant. If not improving recommend physical therapy next. - Urine Dip, auto without Micro  - tiZANidine 4 MG Oral Tab; Take 1 tablet (4 mg total) by mouth nightly as needed. Dispense: 20 tablet; Refill: 0    6. Splenomegaly  - PCV20 (Prevnar 20)    7. Morbid obesity with BMI of 40.0-44.9, adult (HCC)  Recommend healthy diet including green leafy vegetables, fresh fruits and protein. Aerobic exercise 30 minutes five days a week for cardiovascular fitness and 45-60 minutes 6-7 days a week for weight loss. 8. Metastatic melanoma to head and neck ([facility] [address]  9. CML (chronic myelocytic leukemia) ([facility]  Patient to follow-up with his oncologist as planned. Orders Placed This Encounter      Urine Dip, auto without Micro      Lipid Panel      Assay, Thyroid Stim Hormone      Free T4, (Free Thyroxine)      INSURE FECAL GLOBIN by IMMUNOASSAY [[id number]] [Q]      PCV20 (Prevnar 20)      Meds & Refills for this Visit:  Requested Prescriptions     Signed Prescriptions Disp Refills    tiZANidine 4 MG Oral Tab 20 tablet 0     Sig: Take 1 tablet (4 mg total) by mouth nightly as needed. Imaging & Consults:  PCV20 VACCINE FOR INTRAMUSCULAR USE    Return if symptoms worsen or fail to improve.

## 2023-09-06 NOTE — PATIENT INSTRUCTIONS
-Send a [email or web address] message or call in 2 to 4 weeks to let Dr. Jasson Shaver know if the exercises and the tizanidine is helping or not.

## 2023-09-08 ENCOUNTER — LAB ENCOUNTER (OUTPATIENT)
Dept: LAB | Age: 53
End: 2023-09-08
Attending: FAMILY MEDICINE
Payer: COMMERCIAL

## 2023-09-08 DIAGNOSIS — Z12.5 SCREENING FOR PROSTATE CANCER: Primary | ICD-10-CM

## 2023-09-08 LAB
CHOLEST SERPL-MCNC: 174 MG/DL (ref ?–200)
FASTING PATIENT LIPID ANSWER: YES
HDLC SERPL-MCNC: 44 MG/DL (ref 40–59)
LDLC SERPL CALC-MCNC: 108 MG/DL (ref ?–100)
NONHDLC SERPL-MCNC: 130 MG/DL (ref ?–130)
PSA SERPL-MCNC: 0.94 NG/ML (ref ?–4)
T4 FREE SERPL-MCNC: 1 NG/DL (ref 0.8–1.7)
TRIGL SERPL-MCNC: 122 MG/DL (ref 30–149)
TSI SER-ACNC: 1.33 MIU/ML (ref 0.36–3.74)
VLDLC SERPL CALC-MCNC: 21 MG/DL (ref 0–30)

## 2023-09-08 PROCEDURE — 84443 ASSAY THYROID STIM HORMONE: CPT | Performed by: FAMILY MEDICINE

## 2023-09-08 PROCEDURE — 84439 ASSAY OF FREE THYROXINE: CPT | Performed by: FAMILY MEDICINE

## 2023-09-08 PROCEDURE — 36415 COLL VENOUS BLD VENIPUNCTURE: CPT | Performed by: FAMILY MEDICINE

## 2023-09-08 PROCEDURE — 84153 ASSAY OF PSA TOTAL: CPT

## 2023-09-08 PROCEDURE — 80061 LIPID PANEL: CPT | Performed by: FAMILY MEDICINE

## 2023-09-09 ENCOUNTER — PATIENT MESSAGE (OUTPATIENT)
Dept: FAMILY MEDICINE CLINIC | Facility: CLINIC | Age: 53
End: 2023-09-09

## 2023-09-24 PROBLEM — R10.9 FLANK PAIN, CHRONIC: Status: ACTIVE | Noted: 2023-09-24

## 2023-09-24 PROBLEM — G89.29 FLANK PAIN, CHRONIC: Status: ACTIVE | Noted: 2023-09-24

## 2023-11-02 ENCOUNTER — MED REC SCAN ONLY (OUTPATIENT)
Dept: FAMILY MEDICINE CLINIC | Facility: CLINIC | Age: 53
End: 2023-11-02

## 2024-01-29 ENCOUNTER — MED REC SCAN ONLY (OUTPATIENT)
Dept: FAMILY MEDICINE CLINIC | Facility: CLINIC | Age: 54
End: 2024-01-29

## (undated) NOTE — Clinical Note
Date: 2/6/2017    Patient Name: Roel Aviles          To Whom it may concern: The above patient was seen at the [facility] for treatment of a medical condition.     This patient should be excused from attending work from 02/06/2017 thro

## (undated) NOTE — MR AVS SNAPSHOT
[facility]  [phone]               Thank you for choosing us for your health care visit with Jossy Lu MD.  We are glad to serve you and happy to provide you with this julian Bring a paper prescription for each of these medications    - azithromycin 250 MG Tabs  - Guaifenesin-Codeine 100-10 MG/5ML Syrp            MyChart     Visit MyChart  You can access your MyChart to more actively manage your health care and view more detai

## (undated) NOTE — MR AVS SNAPSHOT
[facility]  [phone]               Thank you for choosing us for your health care visit with Madhuri Hercules MD.  We are glad to serve you and happy to provide you with this julian - Amoxicillin-Pot Clavulanate 875-125 MG Tabs      You can get these medications from any pharmacy     Bring a paper prescription for each of these medications    - Guaifenesin-Codeine 100-10 MG/5ML Syrp            MyChart     Visit MyChart  You can acces Get your heart pumping – brisk walking, biking, swimming Even 10 minute increments are effective and add up over the week   2 ½ hours per week – spread out over time Use a brenda to keep you motivated   Don’t forget strength training with weights and resist